# Patient Record
Sex: MALE | Race: OTHER
[De-identification: names, ages, dates, MRNs, and addresses within clinical notes are randomized per-mention and may not be internally consistent; named-entity substitution may affect disease eponyms.]

---

## 2020-01-31 ENCOUNTER — HOSPITAL ENCOUNTER (EMERGENCY)
Dept: HOSPITAL 54 - ER | Age: 45
Discharge: HOME | End: 2020-01-31
Payer: SELF-PAY

## 2020-01-31 VITALS — WEIGHT: 190 LBS | HEIGHT: 72 IN | BODY MASS INDEX: 25.73 KG/M2

## 2020-01-31 VITALS — SYSTOLIC BLOOD PRESSURE: 111 MMHG | DIASTOLIC BLOOD PRESSURE: 66 MMHG
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
## 2020-01-31 DIAGNOSIS — F10.129: Primary | ICD-10-CM

## 2020-01-31 NOTE — NUR
Patient discharged to home in stable condition. Written and verbal after care 
instructions given. Patient verbalizes understanding of instruction.IV removed. 
Catheter intact and site benign. Pressure and 4x4 applied to site. No bleeding 
noted.Pt ambulatory with a steady gait. Pt instructed not to drive, pt 
verbalized understanding and was picked up by uncle.

## 2020-01-31 NOTE — NUR
BIBRA FROM A BAR. INTOXICATED. WITDRAWS WITH PAIN. NON VERBAL. NO RESP 
DISTRESS. BREATHING EVEN AND UNLABORED. VSS. BROUGHT IN D/T PT PASSED OUT AFTER 
HAVING TOO MUCH TO DRINK. ACCRDING TO EMS REPORT, PT LIED DOWN ON THE FLOOR. 
PER EMS, FRIENDS WHO WERE WITH PT DENIED HAVING HEAD TRAUMA BUT PT WAS NOTED 
WITH A SUPERFICIAL ABRASSION ON HIS L EYEBROW. MD AT BEDSIDE FOR EVAL. ORDERS 
RECEIVED, NOTED AND CARRIED OUT.